# Patient Record
Sex: FEMALE | Race: WHITE | URBAN - METROPOLITAN AREA
[De-identification: names, ages, dates, MRNs, and addresses within clinical notes are randomized per-mention and may not be internally consistent; named-entity substitution may affect disease eponyms.]

---

## 2022-06-02 ENCOUNTER — OFFICE VISIT (OUTPATIENT)
Dept: URBAN - METROPOLITAN AREA CLINIC 22 | Facility: CLINIC | Age: 67
End: 2022-06-02
Payer: COMMERCIAL

## 2022-06-02 DIAGNOSIS — H52.03 HYPERMETROPIA, BILATERAL: Primary | ICD-10-CM

## 2022-06-02 PROCEDURE — 92004 COMPRE OPH EXAM NEW PT 1/>: CPT | Performed by: STUDENT IN AN ORGANIZED HEALTH CARE EDUCATION/TRAINING PROGRAM

## 2022-06-02 ASSESSMENT — VISUAL ACUITY
OD: 20/30
OS: 20/25

## 2022-06-02 ASSESSMENT — INTRAOCULAR PRESSURE
OD: 18
OS: 16

## 2022-06-02 NOTE — IMPRESSION/PLAN
Impression: Hypermetropia, bilateral: H52.03. Plan: Discussed findings. New Rx finalized and given to patient. Reduced BCVA OD>OS -- discussed importance of returning for DFE.

## 2022-06-03 ENCOUNTER — OFFICE VISIT (OUTPATIENT)
Dept: URBAN - METROPOLITAN AREA CLINIC 22 | Facility: CLINIC | Age: 67
End: 2022-06-03
Payer: MEDICARE

## 2022-06-03 DIAGNOSIS — H25.813 COMBINED FORMS OF AGE-RELATED CATARACT, BILATERAL: Primary | ICD-10-CM

## 2022-06-03 DIAGNOSIS — H35.411 LATTICE DEGENERATION OF RETINA, RIGHT EYE: ICD-10-CM

## 2022-06-03 PROCEDURE — 99213 OFFICE O/P EST LOW 20 MIN: CPT | Performed by: STUDENT IN AN ORGANIZED HEALTH CARE EDUCATION/TRAINING PROGRAM

## 2022-06-03 PROCEDURE — 92134 CPTRZ OPH DX IMG PST SGM RTA: CPT | Performed by: STUDENT IN AN ORGANIZED HEALTH CARE EDUCATION/TRAINING PROGRAM

## 2022-06-03 ASSESSMENT — INTRAOCULAR PRESSURE
OS: 15
OD: 15

## 2022-06-03 NOTE — IMPRESSION/PLAN
Impression: Lattice degeneration of retina, right eye: H35.411. Plan: Discussed findings: retina intact OU today. Reviewed signs and symptoms of retinal tear and detachment and patient educated to RTC STAT if experiencing.